# Patient Record
Sex: FEMALE | Race: WHITE | NOT HISPANIC OR LATINO | Employment: FULL TIME | URBAN - METROPOLITAN AREA
[De-identification: names, ages, dates, MRNs, and addresses within clinical notes are randomized per-mention and may not be internally consistent; named-entity substitution may affect disease eponyms.]

---

## 2023-03-13 ENCOUNTER — TELEPHONE (OUTPATIENT)
Dept: PRIMARY CARE | Facility: CLINIC | Age: 43
End: 2023-03-13
Payer: COMMERCIAL

## 2023-03-27 ENCOUNTER — TELEMEDICINE (OUTPATIENT)
Dept: PRIMARY CARE | Facility: CLINIC | Age: 43
End: 2023-03-27
Payer: COMMERCIAL

## 2023-03-27 DIAGNOSIS — Z20.822 CLOSE EXPOSURE TO COVID-19 VIRUS: Primary | ICD-10-CM

## 2023-03-27 PROBLEM — R10.13 DYSPEPSIA: Status: ACTIVE | Noted: 2023-03-27

## 2023-03-27 PROBLEM — F41.9 ANXIETY DISORDER: Status: ACTIVE | Noted: 2023-03-27

## 2023-03-27 PROBLEM — H81.10 BENIGN PAROXYSMAL POSITIONAL VERTIGO: Status: ACTIVE | Noted: 2023-03-27

## 2023-03-27 PROBLEM — J34.3 HYPERTROPHY OF BOTH INFERIOR NASAL TURBINATES: Status: ACTIVE | Noted: 2023-03-27

## 2023-03-27 PROBLEM — E01.0 THYROMEGALY: Status: ACTIVE | Noted: 2023-03-27

## 2023-03-27 PROBLEM — F43.21 GRIEF REACTION: Status: ACTIVE | Noted: 2023-03-27

## 2023-03-27 PROBLEM — F32.0 CURRENT MILD EPISODE OF MAJOR DEPRESSIVE DISORDER WITHOUT PRIOR EPISODE (CMS-HCC): Status: ACTIVE | Noted: 2023-03-27

## 2023-03-27 PROBLEM — F43.20 GRIEF REACTION: Status: ACTIVE | Noted: 2023-03-27

## 2023-03-27 LAB — SARS-COV-2 RESULT: NOT DETECTED

## 2023-03-27 PROCEDURE — 99213 OFFICE O/P EST LOW 20 MIN: CPT | Performed by: STUDENT IN AN ORGANIZED HEALTH CARE EDUCATION/TRAINING PROGRAM

## 2023-03-27 PROCEDURE — 87635 SARS-COV-2 COVID-19 AMP PRB: CPT

## 2023-03-27 RX ORDER — TRIAMCINOLONE ACETONIDE 55 UG/1
2 SPRAY, METERED NASAL 2 TIMES DAILY
COMMUNITY
Start: 2023-03-01 | End: 2023-10-12 | Stop reason: ALTCHOICE

## 2023-03-27 RX ORDER — ESCITALOPRAM OXALATE 20 MG/1
20 TABLET ORAL DAILY
COMMUNITY
Start: 2022-05-19 | End: 2023-04-27 | Stop reason: ALTCHOICE

## 2023-03-27 RX ORDER — METHYLPHENIDATE HYDROCHLORIDE 10 MG/1
10 TABLET ORAL 2 TIMES DAILY
COMMUNITY
Start: 2023-02-15 | End: 2023-04-27 | Stop reason: ALTCHOICE

## 2023-03-27 RX ORDER — ALPRAZOLAM 0.25 MG/1
1 TABLET ORAL DAILY PRN
COMMUNITY
Start: 2022-04-28 | End: 2023-07-25 | Stop reason: ALTCHOICE

## 2023-03-27 ASSESSMENT — ENCOUNTER SYMPTOMS
ABDOMINAL PAIN: 0
COUGH: 0
FEVER: 0
DIARRHEA: 1
VOMITING: 0
SORE THROAT: 1
HOARSE VOICE: 0
TROUBLE SWALLOWING: 0
DIZZINESS: 0
LIGHT-HEADEDNESS: 0
SHORTNESS OF BREATH: 0
HEADACHES: 1
NAUSEA: 0

## 2023-03-27 NOTE — PROGRESS NOTES
Subjective   Digna Ware is a 42 y.o. female who presents for Covid-19 Home Monitoring Visit.  Patient seen today for COVID symptoms.  She is feeling headache, and has had some diarrhea.  No upper respiratory symptoms associated.  Lives with her son who tested positive for COVID yesterday.  No other known sick contacts.  Patient denies any fevers.        Sore Throat   This is a new problem. The current episode started today. The problem has been unchanged. Neither side of throat is experiencing more pain than the other. The pain is mild. Associated symptoms include diarrhea and headaches. Pertinent negatives include no abdominal pain, congestion, coughing, ear pain, hoarse voice, shortness of breath, trouble swallowing or vomiting. She has tried acetaminophen for the symptoms. The treatment provided mild relief.       Review of Systems   Constitutional:  Negative for fever.   HENT:  Positive for sore throat. Negative for congestion, ear pain, hoarse voice and trouble swallowing.    Respiratory:  Negative for cough and shortness of breath.    Cardiovascular:  Negative for chest pain.   Gastrointestinal:  Positive for diarrhea. Negative for abdominal pain, nausea and vomiting.   Neurological:  Positive for headaches. Negative for dizziness and light-headedness.   All other systems reviewed and are negative.      Objective   Physical Exam  Constitutional:       Comments: Seen via virtual appointment         Assessment/Plan   Problem List Items Addressed This Visit    None

## 2023-03-29 ENCOUNTER — TELEPHONE (OUTPATIENT)
Dept: PRIMARY CARE | Facility: CLINIC | Age: 43
End: 2023-03-29
Payer: COMMERCIAL

## 2023-03-29 NOTE — TELEPHONE ENCOUNTER
----- Message from Jose Miguel Cuello DO sent at 3/27/2023 11:37 PM EDT -----  Please let patient know COVID test was negative.    Please call if fever or cough or shortness of breath occur.

## 2023-04-24 ENCOUNTER — APPOINTMENT (OUTPATIENT)
Dept: PRIMARY CARE | Facility: CLINIC | Age: 43
End: 2023-04-24
Payer: COMMERCIAL

## 2023-04-27 ENCOUNTER — TELEMEDICINE (OUTPATIENT)
Dept: PRIMARY CARE | Facility: CLINIC | Age: 43
End: 2023-04-27
Payer: COMMERCIAL

## 2023-04-27 VITALS — TEMPERATURE: 98.7 F | WEIGHT: 240 LBS | BODY MASS INDEX: 41.2 KG/M2

## 2023-04-27 DIAGNOSIS — F32.0 CURRENT MILD EPISODE OF MAJOR DEPRESSIVE DISORDER WITHOUT PRIOR EPISODE (CMS-HCC): ICD-10-CM

## 2023-04-27 DIAGNOSIS — F32.0 CURRENT MILD EPISODE OF MAJOR DEPRESSIVE DISORDER WITHOUT PRIOR EPISODE (CMS-HCC): Primary | ICD-10-CM

## 2023-04-27 DIAGNOSIS — F90.2 ATTENTION DEFICIT HYPERACTIVITY DISORDER (ADHD), COMBINED TYPE: ICD-10-CM

## 2023-04-27 PROCEDURE — 99214 OFFICE O/P EST MOD 30 MIN: CPT | Performed by: STUDENT IN AN ORGANIZED HEALTH CARE EDUCATION/TRAINING PROGRAM

## 2023-04-27 RX ORDER — LEVONORGESTREL 52 MG/1
INTRAUTERINE DEVICE INTRAUTERINE
COMMUNITY

## 2023-04-27 RX ORDER — METHYLPHENIDATE HYDROCHLORIDE 10 MG/1
10 CAPSULE, EXTENDED RELEASE ORAL EVERY MORNING
Qty: 30 CAPSULE | Refills: 0 | Status: SHIPPED | OUTPATIENT
Start: 2023-04-27 | End: 2023-04-28 | Stop reason: ALTCHOICE

## 2023-04-27 RX ORDER — BUPROPION HYDROCHLORIDE 150 MG/1
150 TABLET ORAL EVERY MORNING
Qty: 30 TABLET | Refills: 1 | Status: SHIPPED | OUTPATIENT
Start: 2023-04-27 | End: 2023-04-28

## 2023-04-27 ASSESSMENT — ENCOUNTER SYMPTOMS
LOSS OF SENSATION IN FEET: 0
DEPRESSION: 0
OCCASIONAL FEELINGS OF UNSTEADINESS: 0

## 2023-04-28 DIAGNOSIS — F90.2 ATTENTION DEFICIT HYPERACTIVITY DISORDER (ADHD), COMBINED TYPE: Primary | ICD-10-CM

## 2023-04-28 RX ORDER — BUPROPION HYDROCHLORIDE 150 MG/1
150 TABLET ORAL EVERY MORNING
Qty: 90 TABLET | Refills: 0 | Status: SHIPPED | OUTPATIENT
Start: 2023-04-28 | End: 2023-07-10

## 2023-04-28 RX ORDER — DEXTROAMPHETAMINE SACCHARATE, AMPHETAMINE ASPARTATE MONOHYDRATE, DEXTROAMPHETAMINE SULFATE AND AMPHETAMINE SULFATE 2.5; 2.5; 2.5; 2.5 MG/1; MG/1; MG/1; MG/1
10 CAPSULE, EXTENDED RELEASE ORAL EVERY MORNING
Qty: 30 CAPSULE | Refills: 0 | Status: SHIPPED | OUTPATIENT
Start: 2023-04-28 | End: 2023-07-25 | Stop reason: ALTCHOICE

## 2023-04-28 ASSESSMENT — ENCOUNTER SYMPTOMS
VOMITING: 0
DIZZINESS: 0
NAUSEA: 0
FEVER: 0
SHORTNESS OF BREATH: 0
LIGHT-HEADEDNESS: 0

## 2023-04-29 NOTE — PROGRESS NOTES
Subjective   Digna Ware is a 42 y.o. female who presents for Med Refill (Pt to do virtual visit to discuss medications and possible refills).  The patient is a 42-year-old female seen today for evaluation.  She is having difficulty getting in with her psychiatrist and getting lab work for refills.  She is since not taken some psychiatric medications.  ADHD and depressive medicines.  She has been off of everything for 1 month.  She states that she is feeling very overwhelmed and has the anniversary of her father's passing coming up soon.  She has seen counseling at which is significantly improving her symptoms.    From psychiatry she was on stimulant medication for ADHD.  Had significant improvement with this.  Has been unable to get it due to the above reasoning.  She is interested on starting back on this.  She is also interested in restarting something for mood, especially something that helps with binge eating urges.    Med Refill  Pertinent negatives include no chest pain, fever, nausea or vomiting.       Review of Systems   Constitutional:  Negative for fever.   Respiratory:  Negative for shortness of breath.    Cardiovascular:  Negative for chest pain.   Gastrointestinal:  Negative for nausea and vomiting.   Neurological:  Negative for dizziness and light-headedness.   All other systems reviewed and are negative.      Objective   Physical Exam  Constitutional:       Comments: Seen via virtual exam.         Assessment/Plan   Problem List Items Addressed This Visit          Other    Current mild episode of major depressive disorder without prior episode (CMS/[facility]) - Primary     Other Visit Diagnoses       Attention deficit hyperactivity disorder (ADHD), combined type            The patient is seen in for follow-up of ADHD and depression.  We discussed medications, she wants to trial off of the Lexapro.  Discussed starting only on the Wellbutrin for depressive medications to help with binge eating.  Patient was  agreeable.  We will trial at 150 mg.    We will also trial a stimulant medication, she was having palpitations with the methylphenidate initially tried extended release medications but did not have insurance coverage for these.  We will trial a smaller dose of methylphenidate to be taken twice daily, we will do controlled substance agreement next appointment.    Follow-up in 1 month to see how these medication changes are going or sooner if new concerns arise.

## 2023-07-08 DIAGNOSIS — F32.0 CURRENT MILD EPISODE OF MAJOR DEPRESSIVE DISORDER WITHOUT PRIOR EPISODE (CMS-HCC): ICD-10-CM

## 2023-07-10 RX ORDER — BUPROPION HYDROCHLORIDE 150 MG/1
TABLET ORAL
Qty: 90 TABLET | Refills: 0 | Status: SHIPPED | OUTPATIENT
Start: 2023-07-10 | End: 2023-09-29 | Stop reason: SDUPTHER

## 2023-07-25 ENCOUNTER — OFFICE VISIT (OUTPATIENT)
Dept: PRIMARY CARE | Facility: CLINIC | Age: 43
End: 2023-07-25
Payer: COMMERCIAL

## 2023-07-25 VITALS
OXYGEN SATURATION: 98 % | RESPIRATION RATE: 18 BRPM | TEMPERATURE: 97.6 F | WEIGHT: 226 LBS | HEART RATE: 94 BPM | DIASTOLIC BLOOD PRESSURE: 62 MMHG | SYSTOLIC BLOOD PRESSURE: 124 MMHG | BODY MASS INDEX: 38.79 KG/M2

## 2023-07-25 DIAGNOSIS — F43.10 PTSD (POST-TRAUMATIC STRESS DISORDER): ICD-10-CM

## 2023-07-25 DIAGNOSIS — F41.9 ANXIETY DISORDER, UNSPECIFIED TYPE: Primary | ICD-10-CM

## 2023-07-25 DIAGNOSIS — R14.0 BLOATING: ICD-10-CM

## 2023-07-25 DIAGNOSIS — F32.0 CURRENT MILD EPISODE OF MAJOR DEPRESSIVE DISORDER WITHOUT PRIOR EPISODE (CMS-HCC): ICD-10-CM

## 2023-07-25 PROCEDURE — 99214 OFFICE O/P EST MOD 30 MIN: CPT

## 2023-07-25 PROCEDURE — 1036F TOBACCO NON-USER: CPT

## 2023-07-25 RX ORDER — ALPRAZOLAM 0.5 MG/1
0.5 TABLET, EXTENDED RELEASE ORAL EVERY MORNING
Qty: 30 TABLET | Refills: 0 | Status: SHIPPED | OUTPATIENT
Start: 2023-07-25 | End: 2023-08-22

## 2023-07-25 RX ORDER — HYDROGEN PEROXIDE 3 %
20 SOLUTION, NON-ORAL MISCELLANEOUS
COMMUNITY

## 2023-07-25 NOTE — PROGRESS NOTES
Subjective   Digna Ware is a 42 y.o. female who presents for Follow-up ([facility] ER last Monday. Abdominal pain that turned into an anxiety attack. Currently she still has pain and feels bloated. Today she started with back pain.).  7/17/23 went to ED with abdominal pain. She was nervous this meant her IUD came out during sex or she had bladder cancer. She has a family history positive for bladder cancer. She does notsmoke.  No regular drug or alcohol use.  She denies any hematuria, frequency,urgency, burning.  No vaginal bleeding, cramping, discharge. No concern for STDs. She also had some mild nausea. No chest pain, shortness of breath, fevers, chills, or any other symptoms. Bloodwork, UA, and CT imaging was largely unremarkable and her IUD was shown to be in place. Her episoe was thought to be anxiety related, she was given benzos and felt better and discharged to home. The next day she went to obgyn where they also did a pelvic exam and took a photo of her international mirena strings to show her so she would believe that it was in place. Since then her upper back has been a little sore as well. She is having regular Bms, no diarrhea, constipation, or bloody/black Bms. She feels bloated today but no pain. She has been taking anish seltzer and tylenol and nexium in the morning which doesn't really help. Of note, her anxiety has been worse in the last week or 2 due to the anniversary of her dad's passing and her brother's suicide. She is on wellbutrin daily and is in therapy every 2 weeks. She has failed many other mood medications in the past.     Objective   /62 (BP Location: Right arm, Patient Position: Sitting)   Pulse 94   Temp 36.4 °C (97.6 °F)   Resp 18   Wt 103 kg (226 lb)   SpO2 98%   BMI 38.79 kg/m²    PHYSICAL EXAM  Gen: Well appearing  Eyes: EOMI  Heart: RRR, no murmurs  Lungs: No increased work of breathing, on RA  GI: Soft, NTND  Neuro: Alert, symmetrical facies, moves all extremities  equally  Psych: Anxious, intermittently tearful     Assessment/Plan     Problem List Items Addressed This Visit       Current mild episode of major depressive disorder without prior episode (CMS/HCC)    Anxiety disorder - Primary     Will trial long acting xanax for now in addition to her wellbutrin. Will defer UDS and CSA at this time as pt would like to try and get established with a new psychiatrist and they may adjust meds requiring their own UDS and CSA. OARRS reviewed, pt is buying some cannabis products from a dispensary and while using this along with benzodiazepines is not ideal, will make an exception for right now during this acute anxiety crisis. She is to increase her therapy from every 2 weeks to at least weekly, if not more frequent than this.          Relevant Medications    ALPRAZolam XR (Xanax XR) 0.5 mg 24 hr tablet    Other Relevant Orders    Referral to Psychiatry    PTSD (post-traumatic stress disorder)    Bloating     Most organic etiology of abdominal symptoms have been ruled out in the ED with bloodwork, UA, and CT of the abdomen. For bloating and possible gas will trial simethicone and probiotic, but I suspect it could be related to her anxiety so her GI symptoms may improve with anxiety reduction.          Relevant Medications    simethicone 250 mg capsule      Eusebia Conley DO  Family Medicine Resident, PGY-2  [facility]  [address] [place] [address] [phone]

## 2023-07-25 NOTE — ASSESSMENT & PLAN NOTE
Will trial long acting xanax for now in addition to her wellbutrin. Will defer UDS and CSA at this time as pt would like to try and get established with a new psychiatrist and they may adjust meds requiring their own UDS and CSA. OARRS reviewed, pt is buying some cannabis products from a dispensary and while using this along with benzodiazepines is not ideal, will make an exception for right now during this acute anxiety crisis. She is to increase her therapy from every 2 weeks to at least weekly, if not more frequent than this.

## 2023-07-25 NOTE — ASSESSMENT & PLAN NOTE
Most organic etiology of abdominal symptoms have been ruled out in the ED with bloodwork, UA, and CT of the abdomen. For bloating and possible gas will trial simethicone and probiotic, but I suspect it could be related to her anxiety so her GI symptoms may improve with anxiety reduction.

## 2023-07-26 NOTE — PROGRESS NOTES
I saw and evaluated the patient. I personally obtained the key and critical portions of the history and physical exam or was physically present for key and critical portions performed by the resident/fellow. I reviewed the resident/fellow's documentation and discussed the patient with the resident/fellow. I agree with the resident/fellow's medical decision making as documented in the note.    Jose Miguel Cuello, DO

## 2023-08-22 DIAGNOSIS — F41.9 ANXIETY DISORDER, UNSPECIFIED TYPE: ICD-10-CM

## 2023-08-22 RX ORDER — ALPRAZOLAM 0.5 MG/1
0.5 TABLET, EXTENDED RELEASE ORAL EVERY MORNING
Qty: 30 TABLET | Refills: 0 | Status: SHIPPED | OUTPATIENT
Start: 2023-08-22 | End: 2023-09-20

## 2023-09-06 ENCOUNTER — PATIENT MESSAGE (OUTPATIENT)
Dept: PRIMARY CARE | Facility: CLINIC | Age: 43
End: 2023-09-06
Payer: COMMERCIAL

## 2023-09-06 DIAGNOSIS — M77.00 MEDIAL EPICONDYLITIS OF ELBOW, UNSPECIFIED LATERALITY: Primary | ICD-10-CM

## 2023-09-06 RX ORDER — MELOXICAM 15 MG/1
15 TABLET ORAL DAILY PRN
Qty: 30 TABLET | Refills: 1 | Status: SHIPPED | OUTPATIENT
Start: 2023-09-06 | End: 2023-11-13

## 2023-09-20 DIAGNOSIS — F41.9 ANXIETY DISORDER, UNSPECIFIED TYPE: ICD-10-CM

## 2023-09-20 RX ORDER — ALPRAZOLAM 0.5 MG/1
0.5 TABLET, EXTENDED RELEASE ORAL EVERY MORNING
Qty: 30 TABLET | Refills: 0 | Status: SHIPPED | OUTPATIENT
Start: 2023-09-20 | End: 2023-10-23

## 2023-09-29 ENCOUNTER — PATIENT MESSAGE (OUTPATIENT)
Dept: PRIMARY CARE | Facility: CLINIC | Age: 43
End: 2023-09-29
Payer: COMMERCIAL

## 2023-09-29 DIAGNOSIS — F32.0 CURRENT MILD EPISODE OF MAJOR DEPRESSIVE DISORDER WITHOUT PRIOR EPISODE (CMS-HCC): ICD-10-CM

## 2023-09-29 RX ORDER — BUPROPION HYDROCHLORIDE 150 MG/1
150 TABLET ORAL
Qty: 90 TABLET | Refills: 1 | Status: SHIPPED | OUTPATIENT
Start: 2023-09-29 | End: 2024-01-12 | Stop reason: ALTCHOICE

## 2023-10-03 ENCOUNTER — TELEPHONE (OUTPATIENT)
Dept: OBSTETRICS AND GYNECOLOGY | Facility: CLINIC | Age: 43
End: 2023-10-03
Payer: COMMERCIAL

## 2023-10-12 ENCOUNTER — OFFICE VISIT (OUTPATIENT)
Dept: PRIMARY CARE | Facility: CLINIC | Age: 43
End: 2023-10-12
Payer: COMMERCIAL

## 2023-10-12 VITALS
HEART RATE: 118 BPM | DIASTOLIC BLOOD PRESSURE: 70 MMHG | RESPIRATION RATE: 18 BRPM | SYSTOLIC BLOOD PRESSURE: 129 MMHG | BODY MASS INDEX: 37.9 KG/M2 | TEMPERATURE: 98.1 F | OXYGEN SATURATION: 98 % | HEIGHT: 64 IN | WEIGHT: 222 LBS

## 2023-10-12 DIAGNOSIS — M77.12 LATERAL EPICONDYLITIS OF LEFT ELBOW: Primary | ICD-10-CM

## 2023-10-12 PROCEDURE — 99213 OFFICE O/P EST LOW 20 MIN: CPT

## 2023-10-12 PROCEDURE — 1036F TOBACCO NON-USER: CPT

## 2023-10-12 PROCEDURE — 20605 DRAIN/INJ JOINT/BURSA W/O US: CPT

## 2023-10-12 NOTE — ASSESSMENT & PLAN NOTE
Patient has failed conservative therapy with a tendinitis band and meloxicam so she requested an elbow injection which was performed under ultrasound guidance in office today without complication.  Follow-up as needed

## 2023-10-12 NOTE — PROGRESS NOTES
"Subjective   Digna ALMA Ware is a 43 y.o. female who presents for Elbow Pain (Left elbow pain x 1 1/2 months. It is now affecting her sleep. She is getting numbness to her fingers on/off. She can not  a coffee cup. She is unable to stretch her arm out straight. She wears a tendonitis band.).  She has also tried meloxicam which did not help.  In the past she has had an injection of her left elbow and she has had 2 injections on her right elbow which usually help.    Objective   /70 (BP Location: Right arm, Patient Position: Sitting)   Pulse (!) 118   Temp 36.7 °C (98.1 °F)   Resp 18   Ht 1.626 m (5' 4\")   Wt 101 kg (222 lb)   SpO2 98%   BMI 38.11 kg/m²    PHYSICAL EXAM  Gen: Well appearing, in NAD  Eyes: EOMI  Lungs: No increased work of breathing, on RA  Extremities: WWP, cap refill <2sec, no pitting edema in LE b/l, tenderness to palpation of lateral epicondyle and tissue surrounding it, elbow and forearm pain with wrist extension especially against resistance  Neuro: Alert, symmetrical facies, moves all extremities equally  Psych: Appropriate mood and affect    Assessment/Plan     Problem List Items Addressed This Visit       Lateral epicondylitis of left elbow - Primary     Patient has failed conservative therapy with a tendinitis band and meloxicam so she requested an elbow injection which was performed under ultrasound guidance in office today without complication.  Follow-up as needed         Relevant Medications    triamcinolone acetonide (Kenalog-40) injection 10 mg (Completed)    Other Relevant Orders    Joint Injection Medium/Arthrocentesis (Completed)        Elbow Injection Procedure Note    Pre-operative Diagnosis: left lateral epicondylitis    Indications: Pain    Anesthesia:  Lidocaine 2%    Procedure Details: Verbal consent was obtained for the procedure. The point of maximum tenderness was identified and marked. The skin prepped with alcohol pad. Ultrasound guided needle was advanced " into the muscle off of the left lateral epicondyle.    Complications:  None; patient tolerated the procedure well.  Joint Injection Medium/Arthrocentesis on 10/13/2023 12:20 AM  Indications: pain and joint swelling  Details: 25 G needle, ultrasound-guided lateral approach  Medications: 10 mg triamcinolone acetonide 40 mg/mL  Consent was given by the patient.               Eusebia Conley DO  Family Medicine Resident, PGY-3  [facility]  [address] [place] [address] [phone]

## 2023-10-13 RX ORDER — TRIAMCINOLONE ACETONIDE 40 MG/ML
10 INJECTION, SUSPENSION INTRA-ARTICULAR; INTRAMUSCULAR
Status: COMPLETED | OUTPATIENT
Start: 2023-10-13 | End: 2023-10-13

## 2023-10-13 RX ADMIN — TRIAMCINOLONE ACETONIDE 10 MG: 40 INJECTION, SUSPENSION INTRA-ARTICULAR; INTRAMUSCULAR at 08:55

## 2023-10-13 RX ADMIN — TRIAMCINOLONE ACETONIDE 10 MG: 40 INJECTION, SUSPENSION INTRA-ARTICULAR; INTRAMUSCULAR at 00:20

## 2023-10-18 ENCOUNTER — OFFICE VISIT (OUTPATIENT)
Dept: PRIMARY CARE | Facility: CLINIC | Age: 43
End: 2023-10-18
Payer: COMMERCIAL

## 2023-10-18 ENCOUNTER — PATIENT MESSAGE (OUTPATIENT)
Dept: PRIMARY CARE | Facility: CLINIC | Age: 43
End: 2023-10-18

## 2023-10-18 ENCOUNTER — TELEPHONE (OUTPATIENT)
Dept: PRIMARY CARE | Facility: CLINIC | Age: 43
End: 2023-10-18

## 2023-10-18 ENCOUNTER — ANCILLARY PROCEDURE (OUTPATIENT)
Dept: RADIOLOGY | Facility: CLINIC | Age: 43
End: 2023-10-18
Payer: COMMERCIAL

## 2023-10-18 VITALS
OXYGEN SATURATION: 97 % | BODY MASS INDEX: 37.42 KG/M2 | HEART RATE: 113 BPM | SYSTOLIC BLOOD PRESSURE: 134 MMHG | WEIGHT: 218 LBS | TEMPERATURE: 98.2 F | DIASTOLIC BLOOD PRESSURE: 84 MMHG

## 2023-10-18 DIAGNOSIS — M54.50 PAIN OF LUMBAR SPINE: Primary | ICD-10-CM

## 2023-10-18 DIAGNOSIS — M54.50 PAIN OF LUMBAR SPINE: ICD-10-CM

## 2023-10-18 PROCEDURE — 72110 X-RAY EXAM L-2 SPINE 4/>VWS: CPT | Performed by: RADIOLOGY

## 2023-10-18 PROCEDURE — 99213 OFFICE O/P EST LOW 20 MIN: CPT | Performed by: NURSE PRACTITIONER

## 2023-10-18 PROCEDURE — 1036F TOBACCO NON-USER: CPT | Performed by: NURSE PRACTITIONER

## 2023-10-18 PROCEDURE — 72110 X-RAY EXAM L-2 SPINE 4/>VWS: CPT | Mod: FY

## 2023-10-18 RX ORDER — PREDNISONE 20 MG/1
TABLET ORAL
Qty: 18 TABLET | Refills: 0 | Status: SHIPPED | OUTPATIENT
Start: 2023-10-18 | End: 2024-02-01 | Stop reason: ALTCHOICE

## 2023-10-18 ASSESSMENT — ENCOUNTER SYMPTOMS
DIAPHORESIS: 0
APPETITE CHANGE: 0
CARDIOVASCULAR NEGATIVE: 1
CHILLS: 0
NEUROLOGICAL NEGATIVE: 1
RESPIRATORY NEGATIVE: 1
MYALGIAS: 1
GASTROINTESTINAL NEGATIVE: 1

## 2023-10-18 NOTE — TELEPHONE ENCOUNTER
Spoke to patient and relayed results of lumbar spine xray. Pt to start on the prednisone and follow up as advised. No further questions per pt

## 2023-10-18 NOTE — PROGRESS NOTES
Subjective   Patient ID: Digna Ware is a 43 y.o. female who presents for Leg Pain.    Patient says that on Monday, she was sitting at her desk, she went to stand up and she felt this tightness in her lower back and into her buttocks. She says that she has the pain going down her leg. Ibuprofen is not helping. Heating pad helps.  No loss of bowel or bladder function.     Review of Systems   Constitutional:  Negative for appetite change, chills and diaphoresis.   HENT: Negative.     Respiratory: Negative.     Cardiovascular: Negative.    Gastrointestinal: Negative.    Musculoskeletal:  Positive for myalgias.   Neurological: Negative.      Visit Vitals  /84   Pulse (!) 113   Temp 36.8 °C (98.2 °F) (Temporal)   Wt 98.9 kg (218 lb)   SpO2 97%   BMI 37.42 kg/m²   Smoking Status Never   BSA 2.11 m²      Physical Exam  Vitals reviewed.   Constitutional:       Appearance: Normal appearance. She is not toxic-appearing.      Comments: Pt appears in pain    HENT:      Head: Atraumatic.   Cardiovascular:      Rate and Rhythm: Normal rate and regular rhythm.      Heart sounds: Normal heart sounds. No murmur heard.  Pulmonary:      Effort: Pulmonary effort is normal.      Breath sounds: Normal breath sounds. No wheezing or rhonchi.   Musculoskeletal:      Comments: Patient has decreased range of motion. She has pain in her right buttocks when moving her body in all directions   Skin:     General: Skin is warm and dry.   Neurological:      General: No focal deficit present.      Mental Status: She is alert.   Psychiatric:         Mood and Affect: Mood normal.     Assessment/Plan   Problem List Items Addressed This Visit    None  Visit Diagnoses         Codes    Pain of lumbar spine    -  Primary M54.50    Relevant Medications    predniSONE (Deltasone) 20 mg tablet    Other Relevant Orders    XR lumbar spine complete 4+ views    Referral to Orthopaedic Surgery        Patient started on prednisone at this time. Reminded pt to  avoid other anti-inflammatories while on the steroids; she agreed. Will get a lumbar spine xray done to further evaluate reason for pain. Advised pt to go to the ER for any worsening pain, difficulty ambulating, loss of bowel or bladder function or new/concerning symptoms; she agreed. Will call pt when results become available. Pt to follow up in 2-3 days if no better despite the use of the medication.

## 2023-10-23 DIAGNOSIS — F41.9 ANXIETY DISORDER, UNSPECIFIED TYPE: ICD-10-CM

## 2023-10-23 RX ORDER — ALPRAZOLAM 0.5 MG/1
0.5 TABLET, EXTENDED RELEASE ORAL EVERY MORNING
Qty: 30 TABLET | Refills: 0 | Status: SHIPPED | OUTPATIENT
Start: 2023-10-23 | End: 2023-11-30

## 2023-10-24 RX ORDER — TIZANIDINE HYDROCHLORIDE 4 MG/1
4 CAPSULE, GELATIN COATED ORAL 3 TIMES DAILY PRN
Qty: 30 CAPSULE | Refills: 1 | Status: SHIPPED | OUTPATIENT
Start: 2023-10-24 | End: 2023-10-30

## 2023-10-28 DIAGNOSIS — M54.50 PAIN OF LUMBAR SPINE: ICD-10-CM

## 2023-10-30 RX ORDER — TIZANIDINE HYDROCHLORIDE 4 MG/1
4 CAPSULE, GELATIN COATED ORAL 3 TIMES DAILY PRN
Qty: 90 CAPSULE | Refills: 1 | Status: SHIPPED | OUTPATIENT
Start: 2023-10-30 | End: 2023-11-29

## 2023-11-13 DIAGNOSIS — M77.00 MEDIAL EPICONDYLITIS OF ELBOW, UNSPECIFIED LATERALITY: ICD-10-CM

## 2023-11-13 RX ORDER — MELOXICAM 15 MG/1
15 TABLET ORAL DAILY PRN
Qty: 30 TABLET | Refills: 1 | Status: SHIPPED | OUTPATIENT
Start: 2023-11-13 | End: 2024-02-01 | Stop reason: ALTCHOICE

## 2023-11-29 DIAGNOSIS — F41.9 ANXIETY DISORDER, UNSPECIFIED TYPE: ICD-10-CM

## 2023-11-29 DIAGNOSIS — M54.50 PAIN OF LUMBAR SPINE: ICD-10-CM

## 2023-11-29 RX ORDER — TIZANIDINE HYDROCHLORIDE 4 MG/1
4 CAPSULE, GELATIN COATED ORAL 3 TIMES DAILY PRN
Qty: 270 CAPSULE | Refills: 1 | Status: SHIPPED | OUTPATIENT
Start: 2023-11-29 | End: 2024-02-27 | Stop reason: SDUPTHER

## 2023-11-30 RX ORDER — ALPRAZOLAM 0.5 MG/1
0.5 TABLET, EXTENDED RELEASE ORAL EVERY MORNING
Qty: 30 TABLET | Refills: 0 | Status: SHIPPED | OUTPATIENT
Start: 2023-11-30 | End: 2024-01-04

## 2023-12-08 ENCOUNTER — APPOINTMENT (OUTPATIENT)
Dept: RADIOLOGY | Facility: CLINIC | Age: 43
End: 2023-12-08
Payer: COMMERCIAL

## 2023-12-22 ENCOUNTER — OFFICE VISIT (OUTPATIENT)
Dept: OTOLARYNGOLOGY | Facility: CLINIC | Age: 43
End: 2023-12-22
Payer: COMMERCIAL

## 2023-12-22 DIAGNOSIS — H69.93 DYSFUNCTION OF BOTH EUSTACHIAN TUBES: Primary | ICD-10-CM

## 2023-12-22 PROCEDURE — 99212 OFFICE O/P EST SF 10 MIN: CPT | Performed by: OTOLARYNGOLOGY

## 2023-12-22 PROCEDURE — 1036F TOBACCO NON-USER: CPT | Performed by: OTOLARYNGOLOGY

## 2023-12-22 NOTE — PROGRESS NOTES
Digna Ware is a 43 y.o. year old female patient with 6 MONTH FU ON EARS     Patient presents to the office today for assessment of ears.  Patient here today for 6-month follow-up on tubes due to eustachian tube dysfunction.  The patient is doing well and denies other ENT concerns.          Physical Exam:   General appearance: No acute distress. Normal facies. Symmetric facial movement. No gross lesions of the face are noted.  The external ear structures appear normal.  Bilateral tubes intact clean and dry.  Anterior rhinoscopy notes essentially a midline nasal septum. Examination is noted for normal healthy mucosal membranes without any evidence of lesions, polyps, or exudate. The tongue is normally mobile. There are no lesions on the gingiva, buccal, or oral mucosa. There are no oral cavity masses.  The neck is negative for mass lymphadenopathy. The trachea and parotid are clear. The thyroid bed is grossly unremarkable. The salivary gland structures are grossly unremarkable.    Assessment/Plan   1.  Eustachian tube dysfunction  Patient with eustachian tube dysfunction and bilateral ears.  The patient has bilateral tubes which are intact and dry.  Recommendation at this time is observation and follow-up in 6 months or sooner if necessary.    All questions were answered in this regard accordingly.

## 2024-01-03 DIAGNOSIS — F41.9 ANXIETY DISORDER, UNSPECIFIED TYPE: ICD-10-CM

## 2024-01-04 RX ORDER — ALPRAZOLAM 0.5 MG/1
0.5 TABLET, EXTENDED RELEASE ORAL EVERY MORNING
Qty: 30 TABLET | Refills: 0 | Status: SHIPPED | OUTPATIENT
Start: 2024-01-04 | End: 2024-02-01 | Stop reason: ALTCHOICE

## 2024-01-11 ENCOUNTER — PATIENT MESSAGE (OUTPATIENT)
Dept: PRIMARY CARE | Facility: CLINIC | Age: 44
End: 2024-01-11
Payer: COMMERCIAL

## 2024-01-11 DIAGNOSIS — F41.9 ANXIETY DISORDER, UNSPECIFIED TYPE: Primary | ICD-10-CM

## 2024-01-12 DIAGNOSIS — F41.9 ANXIETY DISORDER, UNSPECIFIED TYPE: Primary | ICD-10-CM

## 2024-01-12 RX ORDER — BUSPIRONE HYDROCHLORIDE 5 MG/1
5 TABLET ORAL 2 TIMES DAILY
Qty: 60 TABLET | Refills: 1 | Status: SHIPPED | OUTPATIENT
Start: 2024-01-12 | End: 2024-02-01 | Stop reason: ALTCHOICE

## 2024-01-12 RX ORDER — HYDROXYZINE HYDROCHLORIDE 25 MG/1
25 TABLET, FILM COATED ORAL EVERY 4 HOURS PRN
Qty: 9 TABLET | Refills: 3 | Status: SHIPPED | OUTPATIENT
Start: 2024-01-12 | End: 2024-02-01 | Stop reason: ALTCHOICE

## 2024-01-15 RX ORDER — ESCITALOPRAM OXALATE 10 MG/1
10 TABLET ORAL DAILY
Qty: 30 TABLET | Refills: 1 | Status: SHIPPED | OUTPATIENT
Start: 2024-01-15 | End: 2024-02-01 | Stop reason: ALTCHOICE

## 2024-01-17 ENCOUNTER — TELEPHONE (OUTPATIENT)
Dept: PRIMARY CARE | Facility: CLINIC | Age: 44
End: 2024-01-17
Payer: COMMERCIAL

## 2024-02-01 ENCOUNTER — OFFICE VISIT (OUTPATIENT)
Dept: PRIMARY CARE | Facility: CLINIC | Age: 44
End: 2024-02-01
Payer: COMMERCIAL

## 2024-02-01 VITALS
WEIGHT: 210.5 LBS | SYSTOLIC BLOOD PRESSURE: 114 MMHG | TEMPERATURE: 97.3 F | DIASTOLIC BLOOD PRESSURE: 76 MMHG | HEART RATE: 79 BPM | OXYGEN SATURATION: 99 % | BODY MASS INDEX: 35.94 KG/M2 | RESPIRATION RATE: 18 BRPM | HEIGHT: 64 IN

## 2024-02-01 DIAGNOSIS — F43.21 GRIEF REACTION: ICD-10-CM

## 2024-02-01 DIAGNOSIS — F41.9 ANXIETY DISORDER, UNSPECIFIED TYPE: ICD-10-CM

## 2024-02-01 DIAGNOSIS — F43.10 PTSD (POST-TRAUMATIC STRESS DISORDER): ICD-10-CM

## 2024-02-01 DIAGNOSIS — M77.12 LATERAL EPICONDYLITIS OF LEFT ELBOW: Primary | ICD-10-CM

## 2024-02-01 DIAGNOSIS — F32.0 CURRENT MILD EPISODE OF MAJOR DEPRESSIVE DISORDER WITHOUT PRIOR EPISODE (CMS-HCC): ICD-10-CM

## 2024-02-01 DIAGNOSIS — Z00.00 HEALTHCARE MAINTENANCE: ICD-10-CM

## 2024-02-01 PROCEDURE — 99396 PREV VISIT EST AGE 40-64: CPT | Performed by: STUDENT IN AN ORGANIZED HEALTH CARE EDUCATION/TRAINING PROGRAM

## 2024-02-01 PROCEDURE — 1036F TOBACCO NON-USER: CPT | Performed by: STUDENT IN AN ORGANIZED HEALTH CARE EDUCATION/TRAINING PROGRAM

## 2024-02-01 PROCEDURE — 99213 OFFICE O/P EST LOW 20 MIN: CPT | Performed by: STUDENT IN AN ORGANIZED HEALTH CARE EDUCATION/TRAINING PROGRAM

## 2024-02-01 SDOH — ECONOMIC STABILITY: TRANSPORTATION INSECURITY
IN THE PAST 12 MONTHS, HAS LACK OF TRANSPORTATION KEPT YOU FROM MEETINGS, WORK, OR FROM GETTING THINGS NEEDED FOR DAILY LIVING?: NO

## 2024-02-01 SDOH — ECONOMIC STABILITY: FOOD INSECURITY: WITHIN THE PAST 12 MONTHS, YOU WORRIED THAT YOUR FOOD WOULD RUN OUT BEFORE YOU GOT MONEY TO BUY MORE.: NEVER TRUE

## 2024-02-01 SDOH — ECONOMIC STABILITY: GENERAL
WHICH OF THE FOLLOWING WOULD YOU LIKE TO GET CONNECTED TO IN ORDER TO RECEIVE A DISCOUNT OR FOR FREE? (CHOOSE ALL THAT APPLY): NONE OF THESE

## 2024-02-01 SDOH — ECONOMIC STABILITY: HOUSING INSECURITY
IN THE LAST 12 MONTHS, WAS THERE A TIME WHEN YOU DID NOT HAVE A STEADY PLACE TO SLEEP OR SLEPT IN A SHELTER (INCLUDING NOW)?: NO

## 2024-02-01 SDOH — HEALTH STABILITY: PHYSICAL HEALTH: ON AVERAGE, HOW MANY MINUTES DO YOU ENGAGE IN EXERCISE AT THIS LEVEL?: 30 MIN

## 2024-02-01 SDOH — ECONOMIC STABILITY: FOOD INSECURITY: WITHIN THE PAST 12 MONTHS, THE FOOD YOU BOUGHT JUST DIDN'T LAST AND YOU DIDN'T HAVE MONEY TO GET MORE.: NEVER TRUE

## 2024-02-01 SDOH — HEALTH STABILITY: PHYSICAL HEALTH: ON AVERAGE, HOW MANY DAYS PER WEEK DO YOU ENGAGE IN MODERATE TO STRENUOUS EXERCISE (LIKE A BRISK WALK)?: 4 DAYS

## 2024-02-01 SDOH — ECONOMIC STABILITY: TRANSPORTATION INSECURITY
IN THE PAST 12 MONTHS, HAS THE LACK OF TRANSPORTATION KEPT YOU FROM MEDICAL APPOINTMENTS OR FROM GETTING MEDICATIONS?: NO

## 2024-02-01 SDOH — ECONOMIC STABILITY: INCOME INSECURITY: IN THE LAST 12 MONTHS, WAS THERE A TIME WHEN YOU WERE NOT ABLE TO PAY THE MORTGAGE OR RENT ON TIME?: NO

## 2024-02-01 SDOH — ECONOMIC STABILITY: GENERAL
WHICH OF THE FOLLOWING DO YOU KNOW HOW TO USE AND HAVE ACCESS TO EVERY DAY? (CHOOSE ALL THAT APPLY): DESKTOP COMPUTER, LAPTOP COMPUTER, OR TABLET WITH BROADBAND INTERNET CONNECTION;SMARTPHONE WITH CELLULAR DATA PLAN

## 2024-02-01 ASSESSMENT — ENCOUNTER SYMPTOMS
SINUS PAIN: 0
VOMITING: 0
OCCASIONAL FEELINGS OF UNSTEADINESS: 0
FATIGUE: 0
NAUSEA: 0
PALPITATIONS: 0
DEPRESSION: 0
DIARRHEA: 0
DIZZINESS: 0
FEVER: 0
LOSS OF SENSATION IN FEET: 0
ARTHRALGIAS: 0
HEMATURIA: 0
CONSTIPATION: 0
ABDOMINAL PAIN: 0
FLANK PAIN: 0
RHINORRHEA: 0
APPETITE CHANGE: 0
BLOOD IN STOOL: 0
SHORTNESS OF BREATH: 0
MYALGIAS: 0
LIGHT-HEADEDNESS: 0

## 2024-02-01 ASSESSMENT — SOCIAL DETERMINANTS OF HEALTH (SDOH)
IN THE PAST 12 MONTHS, HAS THE ELECTRIC, GAS, OIL, OR WATER COMPANY THREATENED TO SHUT OFF SERVICE IN YOUR HOME?: NO
WITHIN THE LAST YEAR, HAVE YOU BEEN AFRAID OF YOUR PARTNER OR EX-PARTNER?: NO
HOW HARD IS IT FOR YOU TO PAY FOR THE VERY BASICS LIKE FOOD, HOUSING, MEDICAL CARE, AND HEATING?: NOT HARD AT ALL
WITHIN THE LAST YEAR, HAVE YOU BEEN HUMILIATED OR EMOTIONALLY ABUSED IN OTHER WAYS BY YOUR PARTNER OR EX-PARTNER?: NO
ARE YOU MARRIED, WIDOWED, DIVORCED, SEPARATED, NEVER MARRIED, OR LIVING WITH A PARTNER?: LIVING WITH PARTNER
DO YOU BELONG TO ANY CLUBS OR ORGANIZATIONS SUCH AS CHURCH GROUPS UNIONS, FRATERNAL OR ATHLETIC GROUPS, OR SCHOOL GROUPS?: NO
HOW OFTEN DO YOU GET TOGETHER WITH FRIENDS OR RELATIVES?: THREE TIMES A WEEK
HOW OFTEN DO YOU ATTENT MEETINGS OF THE CLUB OR ORGANIZATION YOU BELONG TO?: NEVER
HOW OFTEN DO YOU ATTEND CHURCH OR RELIGIOUS SERVICES?: NEVER
WITHIN THE LAST YEAR, HAVE TO BEEN RAPED OR FORCED TO HAVE ANY KIND OF SEXUAL ACTIVITY BY YOUR PARTNER OR EX-PARTNER?: NO
IN A TYPICAL WEEK, HOW MANY TIMES DO YOU TALK ON THE PHONE WITH FAMILY, FRIENDS, OR NEIGHBORS?: MORE THAN THREE TIMES A WEEK
WITHIN THE LAST YEAR, HAVE YOU BEEN KICKED, HIT, SLAPPED, OR OTHERWISE PHYSICALLY HURT BY YOUR PARTNER OR EX-PARTNER?: NO

## 2024-02-01 ASSESSMENT — LIFESTYLE VARIABLES
HOW OFTEN DO YOU HAVE A DRINK CONTAINING ALCOHOL: NEVER
HOW MANY STANDARD DRINKS CONTAINING ALCOHOL DO YOU HAVE ON A TYPICAL DAY: PATIENT DOES NOT DRINK

## 2024-02-01 NOTE — PROGRESS NOTES
Subjective   Digna Ware is a 43 y.o. female who is here for a routine exam.  Active Problem List      Comprehensive Medical/Surgical/Social/Family History  History reviewed. No pertinent past medical history.  History reviewed. No pertinent surgical history.  Social History     Social History Narrative    Not on file         Allergies and Medications  Brompheniramine-phenylephrine  Current Outpatient Medications on File Prior to Visit   Medication Sig Dispense Refill    levonorgestrel (Mirena) 21 mcg/24 hours (8 yrs) 52 mg IUD by intrauterine route.      tiZANidine (Zanaflex) 4 mg capsule TAKE 1 CAPSULE (4 MG) BY MOUTH 3 TIMES A DAY AS NEEDED FOR MUSCLE SPASMS. 270 capsule 1    esomeprazole (NexIUM) 20 mg DR capsule Take 1 capsule (20 mg) by mouth once daily in the morning. Take before meals. Do not open capsule.      [DISCONTINUED] ALPRAZolam XR (Xanax XR) 0.5 mg 24 hr tablet TAKE 1 TABLET (0.5 MG) BY MOUTH ONCE DAILY IN THE MORNING. DO NOT CRUSH, CHEW, OR SPLIT. (Patient not taking: Reported on 2/1/2024) 30 tablet 0    [DISCONTINUED] busPIRone (Buspar) 5 mg tablet Take 1 tablet (5 mg) by mouth 2 times a day. (Patient not taking: Reported on 2/1/2024) 60 tablet 1    [DISCONTINUED] escitalopram (Lexapro) 10 mg tablet Take 1 tablet (10 mg) by mouth once daily. (Patient not taking: Reported on 2/1/2024) 30 tablet 1    [DISCONTINUED] hydrOXYzine HCL (Atarax) 25 mg tablet Take 1 tablet (25 mg) by mouth every 4 hours if needed for anxiety. (Patient not taking: Reported on 2/1/2024) 9 tablet 3    [DISCONTINUED] meloxicam (Mobic) 15 mg tablet TAKE 1 TABLET (15 MG) BY MOUTH ONCE DAILY AS NEEDED (PAIN). (Patient not taking: Reported on 2/1/2024) 30 tablet 1    [DISCONTINUED] predniSONE (Deltasone) 20 mg tablet Take 3 tabs (60mg) daily for 3 days, then take 2 tabs (40mg) daily for 3 days, then take 1 tab (20mg) daily for 3 days. (Patient not taking: Reported on 2/1/2024) 18 tablet 0     No current facility-administered  medications on file prior to visit.       Concerns today:  Recent anxiety - failed buspar, lexapro, hydroxyzine, xanax.  Patient is discontinuing all medications, overall she felt better without taking anything because continue with counseling and therapy.  She has positive coping mechanisms, is going through these with above provided by her counselor.  Good support system with her son and significant other at her home.    Denies any significant depressive symptoms, denies any suicidal homicidal ideations.  States that the anxiety is generally improving.    Improving with counseling, family support group    Lifestyle  Diet:  Improving, had previous stress eating  Physical Activity: Insufficiently Active (2/1/2024)    Exercise Vital Sign     Days of Exercise per Week: 4 days     Minutes of Exercise per Session: 30 min      Tobacco Use: Low Risk  (2/1/2024)    Patient History     Smoking Tobacco Use: Never     Smokeless Tobacco Use: Never     Passive Exposure: Not on file      Alcohol Use: Not At Risk (2/1/2024)    AUDIT-C     Frequency of Alcohol Consumption: Never     Average Number of Drinks: Patient does not drink     Frequency of Binge Drinking: Not on file      Depression: Not on file      Stress: Stress Concern Present (2/1/2024)    [employer] [place] [employer] - Occupational Stress Questionnaire     Feeling of Stress : Very much       Consultants:  Sports Med   OB GYN - mammo due        Breast Screening: Up To Date  Pap Smear: Up to date follows with OB/GYN  No intermenstrual bleeding, spotting, or discharge.  With IUD        Review of Systems   Constitutional:  Negative for appetite change, fatigue and fever.   HENT:  Negative for ear discharge, ear pain, hearing loss, postnasal drip, rhinorrhea and sinus pain.    Eyes:  Negative for visual disturbance.   Respiratory:  Negative for shortness of breath.    Cardiovascular:  Negative for chest pain, palpitations and leg swelling.   Gastrointestinal:   "Negative for abdominal pain, blood in stool, constipation, diarrhea, nausea and vomiting.   Genitourinary:  Negative for flank pain and hematuria.   Musculoskeletal:  Negative for arthralgias and myalgias.   Skin:  Negative for rash.   Neurological:  Negative for dizziness and light-headedness.   All other systems reviewed and are negative.      Objective   /76 (BP Location: Right arm, Patient Position: Sitting)   Pulse 79   Temp 36.3 °C (97.3 °F) (Temporal)   Resp 18   Ht 1.626 m (5' 4\")   Wt 95.5 kg (210 lb 8 oz)   SpO2 99%   BMI 36.13 kg/m²     Physical Exam  Vitals reviewed.   Constitutional:       General: She is not in acute distress.     Appearance: Normal appearance. She is normal weight. She is not toxic-appearing.   HENT:      Head: Normocephalic and atraumatic.      Right Ear: Tympanic membrane and ear canal normal.      Left Ear: Tympanic membrane and ear canal normal.      Nose: Nose normal. No congestion or rhinorrhea.      Mouth/Throat:      Mouth: Mucous membranes are dry.   Eyes:      General: No scleral icterus.     Extraocular Movements: Extraocular movements intact.      Conjunctiva/sclera: Conjunctivae normal.      Pupils: Pupils are equal, round, and reactive to light.   Cardiovascular:      Rate and Rhythm: Normal rate and regular rhythm.      Heart sounds: No murmur heard.     No friction rub. No gallop.   Pulmonary:      Effort: Pulmonary effort is normal. No respiratory distress.      Breath sounds: Normal breath sounds. No wheezing, rhonchi or rales.   Abdominal:      General: Abdomen is flat. There is no distension.      Palpations: Abdomen is soft.      Tenderness: There is no abdominal tenderness. There is no guarding.   Musculoskeletal:         General: Normal range of motion.      Cervical back: Normal range of motion and neck supple.      Right lower leg: No edema.      Left lower leg: No edema.   Lymphadenopathy:      Cervical: No cervical adenopathy.   Skin:     " General: Skin is warm and dry.   Neurological:      General: No focal deficit present.      Mental Status: She is alert and oriented to person, place, and time.   Psychiatric:         Mood and Affect: Mood normal.         Behavior: Behavior normal.         Assessment/Plan   Problem List Items Addressed This Visit       Current mild episode of major depressive disorder without prior episode (CMS/HCC)    Anxiety disorder    Grief reaction    PTSD (post-traumatic stress disorder)    Lateral epicondylitis of left elbow - Primary    Relevant Orders    Referral to Sports Medicine     Other Visit Diagnoses       Healthcare maintenance        Relevant Orders    Lipid Panel            Reviewed Social Determinants of health with patient, discussed healthy lifestyle including 150 minutes of physical activity per week  Ordered/Reviewed baseline labwork -CBC, CMP, Lipid Panel  Immunizations Up-to-Date  Pap smear: Up to date  Mammogram: Up To Date follows with OB/GYN    Discussed patient's anxiety, continue follow-up with counseling, call if interested in starting on new medication.  Consider Vraylar versus Trintellix if necessary.    Congratulated patient on her self-help.  Follow-up if new concerns arise.

## 2024-02-14 ENCOUNTER — APPOINTMENT (OUTPATIENT)
Dept: ORTHOPEDIC SURGERY | Facility: CLINIC | Age: 44
End: 2024-02-14
Payer: COMMERCIAL

## 2024-02-15 ENCOUNTER — APPOINTMENT (OUTPATIENT)
Dept: ORTHOPEDIC SURGERY | Facility: CLINIC | Age: 44
End: 2024-02-15
Payer: COMMERCIAL

## 2024-02-19 ENCOUNTER — APPOINTMENT (OUTPATIENT)
Dept: ORTHOPEDIC SURGERY | Facility: CLINIC | Age: 44
End: 2024-02-19
Payer: COMMERCIAL

## 2024-02-23 ENCOUNTER — PATIENT MESSAGE (OUTPATIENT)
Dept: PRIMARY CARE | Facility: CLINIC | Age: 44
End: 2024-02-23
Payer: COMMERCIAL

## 2024-02-23 DIAGNOSIS — R10.13 DYSPEPSIA: Primary | ICD-10-CM

## 2024-02-26 RX ORDER — ONDANSETRON 4 MG/1
4 TABLET, ORALLY DISINTEGRATING ORAL EVERY 8 HOURS PRN
Qty: 20 TABLET | Refills: 0 | Status: SHIPPED | OUTPATIENT
Start: 2024-02-26 | End: 2024-03-04

## 2024-02-27 DIAGNOSIS — M54.50 PAIN OF LUMBAR SPINE: ICD-10-CM

## 2024-02-27 RX ORDER — TIZANIDINE HYDROCHLORIDE 4 MG/1
4 CAPSULE, GELATIN COATED ORAL 3 TIMES DAILY PRN
Qty: 270 CAPSULE | Refills: 1 | Status: SHIPPED | OUTPATIENT
Start: 2024-02-27 | End: 2024-02-27 | Stop reason: ALTCHOICE

## 2024-03-06 ENCOUNTER — PATIENT MESSAGE (OUTPATIENT)
Dept: PRIMARY CARE | Facility: CLINIC | Age: 44
End: 2024-03-06
Payer: COMMERCIAL

## 2024-03-06 DIAGNOSIS — M54.50 PAIN OF LUMBAR SPINE: Primary | ICD-10-CM

## 2024-03-07 RX ORDER — ORPHENADRINE CITRATE 100 MG/1
100 TABLET, EXTENDED RELEASE ORAL 2 TIMES DAILY PRN
Qty: 30 TABLET | Refills: 2 | Status: SHIPPED | OUTPATIENT
Start: 2024-03-07 | End: 2024-04-09 | Stop reason: ALTCHOICE

## 2024-03-29 ENCOUNTER — PATIENT MESSAGE (OUTPATIENT)
Dept: PRIMARY CARE | Facility: CLINIC | Age: 44
End: 2024-03-29

## 2024-03-29 ENCOUNTER — TELEMEDICINE (OUTPATIENT)
Dept: PRIMARY CARE | Facility: CLINIC | Age: 44
End: 2024-03-29
Payer: COMMERCIAL

## 2024-03-29 DIAGNOSIS — R14.0 BLOATING: Primary | ICD-10-CM

## 2024-03-29 DIAGNOSIS — F41.9 ANXIETY DISORDER, UNSPECIFIED TYPE: ICD-10-CM

## 2024-03-29 DIAGNOSIS — F43.10 PTSD (POST-TRAUMATIC STRESS DISORDER): ICD-10-CM

## 2024-03-29 PROCEDURE — 99214 OFFICE O/P EST MOD 30 MIN: CPT | Performed by: STUDENT IN AN ORGANIZED HEALTH CARE EDUCATION/TRAINING PROGRAM

## 2024-03-29 PROCEDURE — 1036F TOBACCO NON-USER: CPT | Performed by: STUDENT IN AN ORGANIZED HEALTH CARE EDUCATION/TRAINING PROGRAM

## 2024-03-29 ASSESSMENT — ENCOUNTER SYMPTOMS
NAUSEA: 0
LIGHT-HEADEDNESS: 0
FEVER: 0
SHORTNESS OF BREATH: 0
VOMITING: 0
DIZZINESS: 0

## 2024-03-29 NOTE — PROGRESS NOTES
Subjective   Digna Ware is a 43 y.o. female who presents for Anxiety (Pt to do virtual visit today to discuss anxiety.).  Patient seen to discuss anxiety.    Patient is recently had cervicogenic headaches and seeing acupuncturist/chiropractics for this.  She started CBD with them.  Is not taking any medications.  Takes as needed muscle relaxer, as needed Atarax.  She is trying to limit how often she is taking these.  Overall doing significantly more improved since starting the acupuncture.  Is interested in holistic options for treatment of her anxiety as well as headaches.  Did discuss potentially seeing a nutritionist to help with an anti-inflammatory diet that can help her symptoms.    She is continuing to see counseling, has activities and stimuli to help with her anxiety.  Doing well with these.    Is working on increasing physical activity.  Denies any depressive symptoms.    Anxiety  Patient reports no chest pain, dizziness, nausea or shortness of breath.           Review of Systems   Constitutional:  Negative for fever.   Respiratory:  Negative for shortness of breath.    Cardiovascular:  Negative for chest pain.   Gastrointestinal:  Negative for nausea and vomiting.   Neurological:  Negative for dizziness and light-headedness.   All other systems reviewed and are negative.      Objective   Physical Exam  Constitutional:       Comments: Seen via virtual exam         Assessment/Plan   Problem List Items Addressed This Visit       Anxiety disorder    PTSD (post-traumatic stress disorder)    Bloating - Primary    Relevant Orders    Referral to Nutrition Services     Patient seen today for anxiety evaluation.    Patient is previously tried multiple medications including BuSpar, Wellbutrin, Lexapro.  No significant improvement with these.  Discussed doing a GeneSight test if wanting to start medication.  Patient was agreeable.    In the meantime we will try a supplement, ashwaganda to help continue with CBD  from acupuncture/chiropractics.    We will refer to nutritionist as well.    Congratulated patient on healthy lifestyle.    Follow-up as new concerns arise.

## 2024-04-03 ENCOUNTER — PATIENT MESSAGE (OUTPATIENT)
Dept: PRIMARY CARE | Facility: CLINIC | Age: 44
End: 2024-04-03
Payer: COMMERCIAL

## 2024-04-03 DIAGNOSIS — S16.1XXD STRAIN OF NECK MUSCLE, SUBSEQUENT ENCOUNTER: Primary | ICD-10-CM

## 2024-04-04 RX ORDER — CYCLOBENZAPRINE HCL 10 MG
10 TABLET ORAL 3 TIMES DAILY PRN
Qty: 30 TABLET | Refills: 2 | Status: SHIPPED | OUTPATIENT
Start: 2024-04-04 | End: 2024-04-09 | Stop reason: SDUPTHER

## 2024-04-09 RX ORDER — CYCLOBENZAPRINE HCL 10 MG
10 TABLET ORAL 3 TIMES DAILY PRN
Qty: 90 TABLET | Refills: 2 | Status: SHIPPED | OUTPATIENT
Start: 2024-04-09 | End: 2024-12-05

## 2024-06-21 ENCOUNTER — APPOINTMENT (OUTPATIENT)
Dept: OTOLARYNGOLOGY | Facility: CLINIC | Age: 44
End: 2024-06-21
Payer: COMMERCIAL

## 2024-06-21 DIAGNOSIS — H69.93 DYSFUNCTION OF BOTH EUSTACHIAN TUBES: Primary | ICD-10-CM

## 2024-06-21 PROCEDURE — 99213 OFFICE O/P EST LOW 20 MIN: CPT | Performed by: OTOLARYNGOLOGY

## 2024-06-21 NOTE — PROGRESS NOTES
Digna Ware is a 43 y.o. year old female patient with 6 MONTH FU ON EARS / TUBES     Patient returns to the office today for follow-up on ears.  Patient with history of eustachian tube dysfunction with bilateral tubes.  Patient doing well and is without other ENT related concerns at this time.        Physical Exam:   General appearance: No acute distress. Normal facies. Symmetric facial movement. No gross lesions of the face are noted.  The external ear structures appear normal.  Bilateral tubes intact clean and dry.  Anterior rhinoscopy notes essentially a midline nasal septum. Examination is noted for normal healthy mucosal membranes without any evidence of lesions, polyps, or exudate. The tongue is normally mobile. There are no lesions on the gingiva, buccal, or oral mucosa. There are no oral cavity masses.  The neck is negative for mass lymphadenopathy. The trachea and parotid are clear. The thyroid bed is grossly unremarkable. The salivary gland structures are grossly unremarkable.    Assessment/Plan     1.  Eustachian tube dysfunction  Patient with eustachian tube dysfunction bilaterally.  Patient's tubes are intact clean and dry.  At this time I recommend observation and see us back in 6 months or sooner if necessary.

## 2024-07-08 DIAGNOSIS — S16.1XXD STRAIN OF NECK MUSCLE, SUBSEQUENT ENCOUNTER: ICD-10-CM

## 2024-07-08 RX ORDER — CYCLOBENZAPRINE HCL 10 MG
10 TABLET ORAL 3 TIMES DAILY PRN
Qty: 90 TABLET | Refills: 2 | Status: SHIPPED | OUTPATIENT
Start: 2024-07-08 | End: 2025-03-05

## 2024-07-23 ENCOUNTER — HOSPITAL ENCOUNTER (OUTPATIENT)
Dept: RADIOLOGY | Facility: CLINIC | Age: 44
Discharge: HOME | End: 2024-07-23
Payer: COMMERCIAL

## 2024-07-23 VITALS — BODY MASS INDEX: 35.85 KG/M2 | HEIGHT: 64 IN | WEIGHT: 210 LBS

## 2024-07-23 DIAGNOSIS — Z12.31 ENCOUNTER FOR SCREENING MAMMOGRAM FOR MALIGNANT NEOPLASM OF BREAST: ICD-10-CM

## 2024-07-23 DIAGNOSIS — N63.10 MASS OF RIGHT BREAST, UNSPECIFIED QUADRANT: ICD-10-CM

## 2024-07-23 PROCEDURE — 77067 SCR MAMMO BI INCL CAD: CPT

## 2024-07-23 PROCEDURE — 77063 BREAST TOMOSYNTHESIS BI: CPT | Mod: BILATERAL PROCEDURE | Performed by: RADIOLOGY

## 2024-07-23 PROCEDURE — 77067 SCR MAMMO BI INCL CAD: CPT | Mod: BILATERAL PROCEDURE | Performed by: RADIOLOGY

## 2024-07-26 ENCOUNTER — HOSPITAL ENCOUNTER (OUTPATIENT)
Dept: RADIOLOGY | Facility: CLINIC | Age: 44
Discharge: HOME | End: 2024-07-26
Payer: COMMERCIAL

## 2024-07-26 DIAGNOSIS — N63.10 MASS OF RIGHT BREAST, UNSPECIFIED QUADRANT: ICD-10-CM

## 2024-07-26 PROCEDURE — 77061 BREAST TOMOSYNTHESIS UNI: CPT | Mod: RT

## 2024-09-13 ENCOUNTER — APPOINTMENT (OUTPATIENT)
Dept: PRIMARY CARE | Facility: CLINIC | Age: 44
End: 2024-09-13
Payer: COMMERCIAL

## 2024-09-13 VITALS — WEIGHT: 202 LBS | BODY MASS INDEX: 34.49 KG/M2

## 2024-09-13 DIAGNOSIS — G43.909 ACUTE MIGRAINE: Primary | ICD-10-CM

## 2024-09-13 DIAGNOSIS — F90.0 ATTENTION DEFICIT HYPERACTIVITY DISORDER (ADHD), PREDOMINANTLY INATTENTIVE TYPE: ICD-10-CM

## 2024-09-13 PROCEDURE — 99214 OFFICE O/P EST MOD 30 MIN: CPT | Performed by: STUDENT IN AN ORGANIZED HEALTH CARE EDUCATION/TRAINING PROGRAM

## 2024-09-13 RX ORDER — ATOMOXETINE 25 MG/1
25 CAPSULE ORAL DAILY
Qty: 30 CAPSULE | Refills: 1 | Status: SHIPPED | OUTPATIENT
Start: 2024-09-13 | End: 2024-11-12

## 2024-09-14 DIAGNOSIS — S16.1XXD STRAIN OF NECK MUSCLE, SUBSEQUENT ENCOUNTER: ICD-10-CM

## 2024-09-14 ASSESSMENT — ENCOUNTER SYMPTOMS
VOMITING: 0
LIGHT-HEADEDNESS: 0
NAUSEA: 0
FEVER: 0
DIZZINESS: 0
SHORTNESS OF BREATH: 0

## 2024-09-14 NOTE — PROGRESS NOTES
Subjective   Digna Ware is a 43 y.o. female who presents for Med Dose Change (Pt to do virtual visit to discuss starting a new visit for ADHD).  Patient seen today for migraines, mental health evaluation as well.  Patient is previously seeing psychiatry has been off of most medications, is doing well thus far.  Significant history of anxiety, previously treated for ADHD with stimulant medications.  She is interested in Strattera today, it was previously not covered by her insurance, she does like that there is decrease risk of anxiety associated with this.    Patient also following up for cervicogenic headaches and migraines, she has had these over the past 6 months, worsening send cervical strain but having not an aura migraines that can last 2 to 3 days, is taking Excedrin, muscle relaxers for this which helped slightly but not all the way, worsening with photosensitivity, no issues with photosensitivity.    Has previously tried over-the-counter NSAIDs, Excedrin without significant relief.        Review of Systems   Constitutional:  Negative for fever.   Respiratory:  Negative for shortness of breath.    Cardiovascular:  Negative for chest pain.   Gastrointestinal:  Negative for nausea and vomiting.   Neurological:  Negative for dizziness and light-headedness.   All other systems reviewed and are negative.      Objective   Physical Exam  Constitutional:       Comments: Seen via virtual exam         Assessment/Plan   Problem List Items Addressed This Visit    None  Visit Diagnoses       Acute migraine    -  Primary    Relevant Medications    rimegepant (Nurtec ODT) 75 mg tablet,disintegrating    Attention deficit hyperactivity disorder (ADHD), predominantly inattentive type        Relevant Medications    atomoxetine (Strattera) 25 mg capsule          Patient seen today for migraines, experiencing 12 migraines that can last 2 to 3 days per month.  She does need to go into a dark room, decrease sensory input with  these.    We will trial Nurtec for her migraines.  Given work restrictions with lighting for this.    For patient's ADHD with significant Struve anxiety we will try nonstimulant treatment with Strattera.  Follow-up in 1 month and see how this medication is working.    Continue follow-up with behavioral health, chiropractics, physical therapy as currently scheduled.

## 2024-09-17 RX ORDER — CYCLOBENZAPRINE HCL 10 MG
10 TABLET ORAL 3 TIMES DAILY PRN
Qty: 90 TABLET | Refills: 2 | Status: SHIPPED | OUTPATIENT
Start: 2024-09-17

## 2024-09-19 DIAGNOSIS — G43.909 ACUTE MIGRAINE: ICD-10-CM

## 2024-10-07 ENCOUNTER — PATIENT MESSAGE (OUTPATIENT)
Dept: PRIMARY CARE | Facility: CLINIC | Age: 44
End: 2024-10-07
Payer: COMMERCIAL

## 2024-10-07 DIAGNOSIS — F90.0 ATTENTION DEFICIT HYPERACTIVITY DISORDER (ADHD), PREDOMINANTLY INATTENTIVE TYPE: Primary | ICD-10-CM

## 2024-10-07 RX ORDER — ATOMOXETINE 40 MG/1
40 CAPSULE ORAL DAILY
Qty: 30 CAPSULE | Refills: 0 | Status: SHIPPED | OUTPATIENT
Start: 2024-10-07 | End: 2024-11-06

## 2024-10-31 DIAGNOSIS — F90.0 ATTENTION DEFICIT HYPERACTIVITY DISORDER (ADHD), PREDOMINANTLY INATTENTIVE TYPE: ICD-10-CM

## 2024-10-31 RX ORDER — ATOMOXETINE 40 MG/1
40 CAPSULE ORAL DAILY
Qty: 90 CAPSULE | Refills: 1 | Status: SHIPPED | OUTPATIENT
Start: 2024-10-31

## 2024-12-12 ENCOUNTER — PATIENT MESSAGE (OUTPATIENT)
Dept: PRIMARY CARE | Facility: CLINIC | Age: 44
End: 2024-12-12
Payer: COMMERCIAL

## 2024-12-12 DIAGNOSIS — F90.0 ATTENTION DEFICIT HYPERACTIVITY DISORDER (ADHD), PREDOMINANTLY INATTENTIVE TYPE: ICD-10-CM

## 2024-12-12 RX ORDER — ATOMOXETINE 40 MG/1
40 CAPSULE ORAL 2 TIMES DAILY
Qty: 90 CAPSULE | Refills: 1 | Status: SHIPPED | OUTPATIENT
Start: 2024-12-12

## 2024-12-19 DIAGNOSIS — F41.9 ANXIETY DISORDER, UNSPECIFIED TYPE: Primary | ICD-10-CM

## 2024-12-19 DIAGNOSIS — F90.0 ATTENTION DEFICIT HYPERACTIVITY DISORDER (ADHD), PREDOMINANTLY INATTENTIVE TYPE: ICD-10-CM

## 2024-12-19 RX ORDER — ATOMOXETINE 40 MG/1
40 CAPSULE ORAL 2 TIMES DAILY
Qty: 180 CAPSULE | Refills: 1 | Status: SHIPPED | OUTPATIENT
Start: 2024-12-19

## 2024-12-19 RX ORDER — HYDROXYZINE HYDROCHLORIDE 25 MG/1
25 TABLET, FILM COATED ORAL 3 TIMES DAILY PRN
Qty: 30 TABLET | Refills: 1 | Status: SHIPPED | OUTPATIENT
Start: 2024-12-19

## 2024-12-20 ENCOUNTER — APPOINTMENT (OUTPATIENT)
Dept: OTOLARYNGOLOGY | Facility: CLINIC | Age: 44
End: 2024-12-20
Payer: COMMERCIAL

## 2024-12-20 DIAGNOSIS — H69.93 DYSFUNCTION OF BOTH EUSTACHIAN TUBES: Primary | ICD-10-CM

## 2024-12-20 PROCEDURE — 69433 CREATE EARDRUM OPENING: CPT | Performed by: OTOLARYNGOLOGY

## 2024-12-23 NOTE — PROGRESS NOTES
Patient returns.  Seeing her back today for her left ear.  She believes the tube is out.  She would like to have it replaced if able.  The remaining ENT inquiry is otherwise clear.    Examination does indeed confirm the tube to be outlined in the canal with wax etc.  This is removed.    Procedure:  After verbal informed consent, patient had topical phenol applied to the mid inferior aspect of the tympanic membrane on the left side with myringotomy and placement of a grommet type tube.  Patient tolerated procedure well.  No complication and no blood loss.      Assessment and plan:  Chronic eustachian tube dysfunction status post replacement of ear tube today.  Recheck 6 months, sooner with issue.  All questions were answered in this regard accordingly.

## 2024-12-30 DIAGNOSIS — F41.9 ANXIETY DISORDER, UNSPECIFIED TYPE: Primary | ICD-10-CM

## 2025-01-15 DIAGNOSIS — F41.9 ANXIETY DISORDER, UNSPECIFIED TYPE: ICD-10-CM

## 2025-01-15 RX ORDER — HYDROXYZINE HYDROCHLORIDE 25 MG/1
25 TABLET, FILM COATED ORAL 3 TIMES DAILY PRN
Qty: 90 TABLET | Refills: 3 | Status: SHIPPED | OUTPATIENT
Start: 2025-01-15

## 2025-02-20 ENCOUNTER — TELEMEDICINE (OUTPATIENT)
Dept: PRIMARY CARE | Facility: CLINIC | Age: 45
End: 2025-02-20
Payer: COMMERCIAL

## 2025-02-20 VITALS — WEIGHT: 195 LBS | BODY MASS INDEX: 33.29 KG/M2

## 2025-02-20 DIAGNOSIS — R23.2 HOT FLASHES: Primary | ICD-10-CM

## 2025-02-20 DIAGNOSIS — R73.9 HYPERGLYCEMIA: ICD-10-CM

## 2025-02-20 PROCEDURE — 99214 OFFICE O/P EST MOD 30 MIN: CPT | Performed by: STUDENT IN AN ORGANIZED HEALTH CARE EDUCATION/TRAINING PROGRAM

## 2025-02-20 RX ORDER — FLUOXETINE HYDROCHLORIDE 20 MG/1
20 CAPSULE ORAL
COMMUNITY
Start: 2025-01-29

## 2025-02-20 RX ORDER — LAMOTRIGINE 25 MG/1
TABLET ORAL
COMMUNITY
Start: 2025-02-18

## 2025-02-20 ASSESSMENT — PATIENT HEALTH QUESTIONNAIRE - PHQ9
7. TROUBLE CONCENTRATING ON THINGS, SUCH AS READING THE NEWSPAPER OR WATCHING TELEVISION: NEARLY EVERY DAY
5. POOR APPETITE OR OVEREATING: NOT AT ALL
2. FEELING DOWN, DEPRESSED OR HOPELESS: NEARLY EVERY DAY
SUM OF ALL RESPONSES TO PHQ QUESTIONS 1-9: 18
3. TROUBLE FALLING OR STAYING ASLEEP: NEARLY EVERY DAY
10. IF YOU CHECKED OFF ANY PROBLEMS, HOW DIFFICULT HAVE THESE PROBLEMS MADE IT FOR YOU TO DO YOUR WORK, TAKE CARE OF THINGS AT HOME, OR GET ALONG WITH OTHER PEOPLE: EXTREMELY DIFFICULT
6. FEELING BAD ABOUT YOURSELF - OR THAT YOU ARE A FAILURE OR HAVE LET YOURSELF OR YOUR FAMILY DOWN: NEARLY EVERY DAY
SUM OF ALL RESPONSES TO PHQ9 QUESTIONS 1 & 2: 6
1. LITTLE INTEREST OR PLEASURE IN DOING THINGS: NEARLY EVERY DAY
8. MOVING OR SPEAKING SO SLOWLY THAT OTHER PEOPLE COULD HAVE NOTICED. OR THE OPPOSITE, BEING SO FIGETY OR RESTLESS THAT YOU HAVE BEEN MOVING AROUND A LOT MORE THAN USUAL: NEARLY EVERY DAY
4. FEELING TIRED OR HAVING LITTLE ENERGY: NOT AT ALL
9. THOUGHTS THAT YOU WOULD BE BETTER OFF DEAD, OR OF HURTING YOURSELF: NOT AT ALL

## 2025-02-20 ASSESSMENT — ENCOUNTER SYMPTOMS
DEPRESSION: 0
LOSS OF SENSATION IN FEET: 0
OCCASIONAL FEELINGS OF UNSTEADINESS: 0

## 2025-02-21 NOTE — PROGRESS NOTES
Subjective   Digna Ware is a 44 y.o. female who presents for Lab Orders and Follow-up (Pt to do virtual visit today to request labs and to discuss medications).  Patient seen today to discuss overall status and concern for lab evaluation.    Overall she has been doing well, she is continuing with her therapist, is continue with psychiatry as had slight changes to medications, but will be starting EMDR therapy for PTSD and depression.  She is a little nervous about starting this but is excited to try something new.  She is doing well with the psychiatry changes to medications.    She still has work restrictions which are making her work tolerable.    She is having which she believes menopause symptoms with hot flashes, changes in emotions, discussed with OB/GYN, recommended following up with us for further evaluation she is interested in being tested, her family's long history of early menopause.  She has had longer irregular cycles with menopause.        Review of Systems    Objective   Physical Exam  Constitutional:       Comments: Seen via virtual exam         Assessment/Plan   Problem List Items Addressed This Visit    None  Visit Diagnoses       Hot flashes    -  Primary    Relevant Orders    TSH with reflex to Free T4 if abnormal    FSH & LH    Testosterone    Estradiol    Hemoglobin A1C    Prolactin level    Hyperglycemia        Relevant Orders    Hemoglobin A1C    Prolactin level          Patient seen today for further evaluation, continue follow-up with psychiatry and psychology continue with treatment for depression, PTSD.    For patient's symptoms hot flashes and changes in menstrual cycle we will do hormone testing, but also check for hemoglobin A1c and prolactin with history of hyperglycemia.    Follow-up as new concerns arise.

## 2025-03-08 LAB
EST. AVERAGE GLUCOSE BLD GHB EST-MCNC: 103 MG/DL
EST. AVERAGE GLUCOSE BLD GHB EST-SCNC: 5.7 MMOL/L
ESTRADIOL SERPL-MCNC: 56 PG/ML
FSH SERPL-ACNC: 61.8 MIU/ML
HBA1C MFR BLD: 5.2 % OF TOTAL HGB
LH SERPL-ACNC: 37.8 MIU/ML
PROLACTIN SERPL-MCNC: 5.1 NG/ML
T4 FREE SERPL-MCNC: 1.3 NG/DL (ref 0.8–1.8)
TSH SERPL-ACNC: 0.28 MIU/L

## 2025-03-10 ENCOUNTER — PATIENT MESSAGE (OUTPATIENT)
Dept: PRIMARY CARE | Facility: CLINIC | Age: 45
End: 2025-03-10
Payer: COMMERCIAL

## 2025-03-10 DIAGNOSIS — R23.2 HOT FLASHES: Primary | ICD-10-CM

## 2025-03-10 DIAGNOSIS — E05.90 HYPERTHYROIDISM: Primary | ICD-10-CM

## 2025-03-20 DIAGNOSIS — R11.0 NAUSEA: Primary | ICD-10-CM

## 2025-03-20 RX ORDER — ONDANSETRON 4 MG/1
4 TABLET, ORALLY DISINTEGRATING ORAL EVERY 8 HOURS PRN
Qty: 20 TABLET | Refills: 1 | Status: SHIPPED | OUTPATIENT
Start: 2025-03-20

## 2025-03-23 LAB
TESTOST FREE SERPL-MCNC: 3 PG/ML (ref 0.1–6.4)
TESTOST SERPL-MCNC: 35 NG/DL (ref 2–45)

## 2025-05-26 DIAGNOSIS — F41.9 ANXIETY DISORDER, UNSPECIFIED TYPE: ICD-10-CM

## 2025-05-27 RX ORDER — HYDROXYZINE HYDROCHLORIDE 25 MG/1
25 TABLET, FILM COATED ORAL 3 TIMES DAILY PRN
Qty: 270 TABLET | Refills: 1 | Status: SHIPPED | OUTPATIENT
Start: 2025-05-27

## 2025-06-17 DIAGNOSIS — S16.1XXD STRAIN OF NECK MUSCLE, SUBSEQUENT ENCOUNTER: ICD-10-CM

## 2025-06-17 RX ORDER — CYCLOBENZAPRINE HCL 10 MG
10 TABLET ORAL 3 TIMES DAILY PRN
Qty: 90 TABLET | Refills: 2 | Status: SHIPPED | OUTPATIENT
Start: 2025-06-17

## 2025-06-20 ENCOUNTER — APPOINTMENT (OUTPATIENT)
Dept: OTOLARYNGOLOGY | Facility: CLINIC | Age: 45
End: 2025-06-20
Payer: COMMERCIAL

## 2025-06-23 DIAGNOSIS — S16.1XXD STRAIN OF NECK MUSCLE, SUBSEQUENT ENCOUNTER: ICD-10-CM

## 2025-06-23 RX ORDER — CYCLOBENZAPRINE HCL 10 MG
10 TABLET ORAL 3 TIMES DAILY PRN
Qty: 270 TABLET | Refills: 1 | Status: SHIPPED | OUTPATIENT
Start: 2025-06-23 | End: 2026-06-18

## 2025-08-20 DIAGNOSIS — S16.1XXD STRAIN OF NECK MUSCLE, SUBSEQUENT ENCOUNTER: ICD-10-CM

## 2025-08-20 RX ORDER — CYCLOBENZAPRINE HCL 10 MG
10 TABLET ORAL 3 TIMES DAILY PRN
Qty: 270 TABLET | Refills: 1 | Status: SHIPPED | OUTPATIENT
Start: 2025-08-20 | End: 2026-08-15